# Patient Record
Sex: FEMALE | Race: WHITE | NOT HISPANIC OR LATINO | URBAN - METROPOLITAN AREA
[De-identification: names, ages, dates, MRNs, and addresses within clinical notes are randomized per-mention and may not be internally consistent; named-entity substitution may affect disease eponyms.]

---

## 2024-02-11 NOTE — PROGRESS NOTES
"Annual  Subjective   Shy Osorio is a 43 y.o. female who is here for a routine exam.     Complaints:   irritability, mood changes  Periods: regular:     Dysmenorrhea:  none    Current contraception:   History of abnormal Pap smear: no  History of abnormal mammogram: no      OB History          3    Para   1    Term   1            AB   2    Living   1         SAB   1    IAB        Ectopic   1    Multiple        Live Births   1                  Review of Systems    Objective   /68   Ht 1.651 m (5' 5\")   Wt 57.6 kg (127 lb)   LMP 2024 (Exact Date)   BMI 21.13 kg/m²        General:   Alert and oriented, in no acute distress   Neck: Supple. No visible thyromegaly.    Breast/Axilla: Normal to palpation bilaterally without masses, skin changes, or nipple discharge.    Abdomen: Soft, non-tender, without masses or organomegaly   Vulva: Normal architecture without erythema, masses, or lesions.    Vagina: Normal mucosa without lesions, masses, or atrophy. No abnormal vaginal discharge.    Cervix: Normal without masses, lesions, or signs of cervicitis   Uterus: Normal, mobile, non-enlarged uterus   Adnexa: Normal without masses or lesions   Pelvic Floor normal   Psych Normal affect. Normal mood.      Assessment/Plan   Diagnoses and all orders for this visit:  Encounter for gynecological examination without abnormal finding  -     THINPREP PAP TEST  Screening mammogram for breast cancer  -     BI mammo bilateral screening tomosynthesis; Future  Menopause  -     Follicle Stimulating Hormone; Future  -     Luteinizing Hormone; Future    Routine annual    Pap due  Mammogram    Vi Tripp MD   "

## 2024-02-15 ENCOUNTER — OFFICE VISIT (OUTPATIENT)
Dept: OBSTETRICS AND GYNECOLOGY | Facility: CLINIC | Age: 44
End: 2024-02-15
Payer: COMMERCIAL

## 2024-02-15 ENCOUNTER — LAB (OUTPATIENT)
Dept: LAB | Facility: LAB | Age: 44
End: 2024-02-15
Payer: COMMERCIAL

## 2024-02-15 VITALS
DIASTOLIC BLOOD PRESSURE: 68 MMHG | BODY MASS INDEX: 21.16 KG/M2 | WEIGHT: 127 LBS | HEIGHT: 65 IN | SYSTOLIC BLOOD PRESSURE: 110 MMHG

## 2024-02-15 DIAGNOSIS — Z78.0 MENOPAUSE: ICD-10-CM

## 2024-02-15 DIAGNOSIS — Z01.419 ENCOUNTER FOR GYNECOLOGICAL EXAMINATION WITHOUT ABNORMAL FINDING: Primary | ICD-10-CM

## 2024-02-15 DIAGNOSIS — Z12.31 SCREENING MAMMOGRAM FOR BREAST CANCER: ICD-10-CM

## 2024-02-15 PROCEDURE — 83001 ASSAY OF GONADOTROPIN (FSH): CPT

## 2024-02-15 PROCEDURE — 1036F TOBACCO NON-USER: CPT | Performed by: OBSTETRICS & GYNECOLOGY

## 2024-02-15 PROCEDURE — 36415 COLL VENOUS BLD VENIPUNCTURE: CPT

## 2024-02-15 PROCEDURE — 88141 CYTOPATH C/V INTERPRET: CPT | Performed by: PATHOLOGY

## 2024-02-15 PROCEDURE — 83002 ASSAY OF GONADOTROPIN (LH): CPT

## 2024-02-15 PROCEDURE — 87624 HPV HI-RISK TYP POOLED RSLT: CPT | Performed by: OBSTETRICS & GYNECOLOGY

## 2024-02-15 PROCEDURE — 88175 CYTOPATH C/V AUTO FLUID REDO: CPT | Mod: TC,GCY | Performed by: OBSTETRICS & GYNECOLOGY

## 2024-02-15 PROCEDURE — 99396 PREV VISIT EST AGE 40-64: CPT | Performed by: OBSTETRICS & GYNECOLOGY

## 2024-02-15 RX ORDER — VALACYCLOVIR HYDROCHLORIDE 500 MG/1
500 TABLET, FILM COATED ORAL 3 TIMES DAILY PRN
COMMUNITY
Start: 2017-05-08

## 2024-02-15 ASSESSMENT — LIFESTYLE VARIABLES
HOW OFTEN DO YOU HAVE A DRINK CONTAINING ALCOHOL: 2-4 TIMES A MONTH
SKIP TO QUESTIONS 9-10: 0
AUDIT-C TOTAL SCORE: 3
HOW MANY STANDARD DRINKS CONTAINING ALCOHOL DO YOU HAVE ON A TYPICAL DAY: 3 OR 4
HOW OFTEN DO YOU HAVE SIX OR MORE DRINKS ON ONE OCCASION: NEVER

## 2024-02-15 ASSESSMENT — PATIENT HEALTH QUESTIONNAIRE - PHQ9
SUM OF ALL RESPONSES TO PHQ9 QUESTIONS 1 & 2: 0
1. LITTLE INTEREST OR PLEASURE IN DOING THINGS: NOT AT ALL
2. FEELING DOWN, DEPRESSED OR HOPELESS: NOT AT ALL

## 2024-02-15 ASSESSMENT — ENCOUNTER SYMPTOMS
OCCASIONAL FEELINGS OF UNSTEADINESS: 0
DEPRESSION: 0
LOSS OF SENSATION IN FEET: 0

## 2024-02-15 ASSESSMENT — PAIN SCALES - GENERAL: PAINLEVEL: 0-NO PAIN

## 2024-02-16 LAB
FSH SERPL-ACNC: 10.2 IU/L
LH SERPL-ACNC: 5.3 IU/L

## 2024-02-29 ENCOUNTER — OFFICE VISIT (OUTPATIENT)
Dept: PRIMARY CARE | Facility: CLINIC | Age: 44
End: 2024-02-29
Payer: COMMERCIAL

## 2024-02-29 VITALS
SYSTOLIC BLOOD PRESSURE: 124 MMHG | HEIGHT: 65 IN | WEIGHT: 129 LBS | HEART RATE: 88 BPM | OXYGEN SATURATION: 97 % | DIASTOLIC BLOOD PRESSURE: 72 MMHG | BODY MASS INDEX: 21.49 KG/M2

## 2024-02-29 DIAGNOSIS — Z13.29 SCREENING FOR ENDOCRINE DISORDER: ICD-10-CM

## 2024-02-29 DIAGNOSIS — Z13.220 LIPID SCREENING: ICD-10-CM

## 2024-02-29 DIAGNOSIS — F41.1 GAD (GENERALIZED ANXIETY DISORDER): ICD-10-CM

## 2024-02-29 DIAGNOSIS — Z13.0 SCREENING, ANEMIA, DEFICIENCY, IRON: ICD-10-CM

## 2024-02-29 DIAGNOSIS — Z00.00 ROUTINE GENERAL MEDICAL EXAMINATION AT A HEALTH CARE FACILITY: Primary | ICD-10-CM

## 2024-02-29 DIAGNOSIS — Z13.1 SCREENING FOR DIABETES MELLITUS: ICD-10-CM

## 2024-02-29 LAB
CYTOLOGY CMNT CVX/VAG CYTO-IMP: NORMAL
HPV HR 12 DNA GENITAL QL NAA+PROBE: NEGATIVE
HPV HR GENOTYPES PNL CVX NAA+PROBE: NEGATIVE
HPV16 DNA SPEC QL NAA+PROBE: NEGATIVE
HPV18 DNA SPEC QL NAA+PROBE: NEGATIVE
LAB AP HPV GENOTYPE QUESTION: YES
LAB AP HPV HR: NORMAL
LABORATORY COMMENT REPORT: NORMAL
PATH REPORT.TOTAL CANCER: NORMAL
POC APPEARANCE, URINE: CLEAR
POC BILIRUBIN, URINE: NEGATIVE
POC BLOOD, URINE: NEGATIVE
POC COLOR, URINE: YELLOW
POC GLUCOSE, URINE: NEGATIVE MG/DL
POC KETONES, URINE: NEGATIVE MG/DL
POC LEUKOCYTES, URINE: NEGATIVE
POC NITRITE,URINE: NEGATIVE
POC PH, URINE: 7 PH
POC PROTEIN, URINE: NEGATIVE MG/DL
POC SPECIFIC GRAVITY, URINE: 1.01
POC UROBILINOGEN, URINE: 0.2 EU/DL

## 2024-02-29 PROCEDURE — 1036F TOBACCO NON-USER: CPT | Performed by: FAMILY MEDICINE

## 2024-02-29 PROCEDURE — 81003 URINALYSIS AUTO W/O SCOPE: CPT | Mod: QW | Performed by: FAMILY MEDICINE

## 2024-02-29 PROCEDURE — 99396 PREV VISIT EST AGE 40-64: CPT | Performed by: FAMILY MEDICINE

## 2024-02-29 ASSESSMENT — ENCOUNTER SYMPTOMS
DYSPHORIC MOOD: 0
SLEEP DISTURBANCE: 1
HEMATOLOGIC/LYMPHATIC NEGATIVE: 1
MUSCULOSKELETAL NEGATIVE: 1
CARDIOVASCULAR NEGATIVE: 1
PAIN: 1
NEUROLOGICAL NEGATIVE: 1
CONSTITUTIONAL NEGATIVE: 1
GASTROINTESTINAL NEGATIVE: 1
RESPIRATORY NEGATIVE: 1
NERVOUS/ANXIOUS: 1

## 2024-02-29 ASSESSMENT — PATIENT HEALTH QUESTIONNAIRE - PHQ9
2. FEELING DOWN, DEPRESSED OR HOPELESS: NOT AT ALL
SUM OF ALL RESPONSES TO PHQ9 QUESTIONS 1 AND 2: 0
1. LITTLE INTEREST OR PLEASURE IN DOING THINGS: NOT AT ALL

## 2024-02-29 ASSESSMENT — PAIN SCALES - GENERAL: PAINLEVEL: 0-NO PAIN

## 2024-02-29 NOTE — PROGRESS NOTES
[facility]: [facility]  PHYSICAL EXAM      Shy Osorio is a 43 y.o. female that is presenting today for Annual Exam (Patient is complaining of anxiety, happening more at night, intermittent/dd) and Pain (Patient is having pain on the left side/dd).     Subjective   42 yo here for annual exam    She is concerned about anxiety-  comes and goes usually daily.  Will wake up at night and have an issue falling asleep.  Episodes will last up to 2 hours.  Taking herbal suppliments, magnesium to help.  She reports some family issues.  Usually no issues falling asleep.  She doesn't want to use medication.     she states she has been getting low back sensitivity.      Exercise yoga, leonid notices it but still can do exercise.     Dr Tripp, checked hormones.     Does reports feet and hands are very red and burning during the winter more than summer.  Does cause some grief.           Pain      Review of Systems   Constitutional: Negative.    HENT: Negative.     Respiratory: Negative.     Cardiovascular: Negative.    Gastrointestinal: Negative.    Genitourinary: Negative.    Musculoskeletal: Negative.    Skin: Negative.    Neurological: Negative.    Hematological: Negative.    Psychiatric/Behavioral:  Positive for sleep disturbance. Negative for dysphoric mood. The patient is nervous/anxious.        History    History reviewed. No pertinent past medical history.  Past Surgical History:   Procedure Laterality Date    OTHER SURGICAL HISTORY  07/11/2016    Corneal LASIK Bilateral     No family history on file.  Allergies   Allergen Reactions    House Dust Mite Unknown    Cat's Claw Rash     Current Outpatient Medications on File Prior to Visit   Medication Sig Dispense Refill    valACYclovir (Valtrex) 500 mg tablet Take 1 tablet (500 mg) by mouth 3 times a day as needed.       No current facility-administered medications on file prior to visit.       There is no immunization history on file for this  patient.  Patient's medical history was reviewed and updated either before or during this encounter.    Objective   Vitals:    02/29/24 1027   BP: 124/72   Pulse: 88   SpO2: 97%      Physical Exam  Constitutional:       General: She is not in acute distress.     Appearance: Normal appearance. She is not ill-appearing.   HENT:      Right Ear: Tympanic membrane, ear canal and external ear normal. There is no impacted cerumen.      Left Ear: Tympanic membrane, ear canal and external ear normal.      Nose: Nose normal.      Mouth/Throat:      Pharynx: Oropharynx is clear. No posterior oropharyngeal erythema.   Eyes:      Extraocular Movements: Extraocular movements intact.      Pupils: Pupils are equal, round, and reactive to light.   Neck:      Thyroid: No thyroid mass or thyroid tenderness.      Vascular: No carotid bruit.   Cardiovascular:      Rate and Rhythm: Normal rate and regular rhythm.      Pulses:           Radial pulses are 2+ on the right side and 2+ on the left side.        Dorsalis pedis pulses are 2+ on the right side and 2+ on the left side.      Heart sounds: Normal heart sounds. No murmur heard.     No friction rub. No gallop.   Pulmonary:      Effort: Pulmonary effort is normal.      Breath sounds: Normal breath sounds.   Abdominal:      General: Bowel sounds are normal.      Palpations: Abdomen is soft. There is no hepatomegaly or splenomegaly.      Tenderness: There is no abdominal tenderness.   Musculoskeletal:      Cervical back: Normal range of motion. No tenderness.      Right lower leg: No edema.      Left lower leg: No edema.      Comments: No gross abnormalities back is NT no CVAT.     Lymphadenopathy:      Cervical: No cervical adenopathy.      Upper Body:      Right upper body: No supraclavicular adenopathy.      Left upper body: No supraclavicular adenopathy.   Skin:     General: Skin is warm.      Capillary Refill: Capillary refill takes less than 2 seconds.      Comments: Hands and  fingers bilaterally very red, blanchable.     Neurological:      General: No focal deficit present.      Mental Status: She is alert.      Cranial Nerves: Cranial nerves 2-12 are intact.      Coordination: Coordination is intact.      Gait: Gait is intact.      Comments: No obvious neurological deficits    Psychiatric:         Mood and Affect: Mood and affect normal.         Assessment/Plan      There is no problem list on file for this patient.    Diagnoses and all orders for this visit:  Routine general medical examination at a health care facility  -     Comprehensive Metabolic Panel; Future  -     TSH with reflex to Free T4 if abnormal; Future  -     CBC and Auto Differential; Future  -     Lipid Panel; Future  -     POCT UA Automated manually resulted  Screening for diabetes mellitus  -     Comprehensive Metabolic Panel; Future  -     Lipid Panel; Future  Lipid screening  -     Lipid Panel; Future  TAYLOR (generalized anxiety disorder)  Screening for endocrine disorder  -     TSH with reflex to Free T4 if abnormal; Future  Screening, anemia, deficiency, iron  -     CBC and Auto Differential; Future      The patient was encouraged to ensure that any/all documentation is accurate and up to date, and that our office be provided a copy in the event that anything changes.    Jose Shin MD

## 2024-03-01 ENCOUNTER — LAB (OUTPATIENT)
Dept: LAB | Facility: LAB | Age: 44
End: 2024-03-01
Payer: COMMERCIAL

## 2024-03-01 DIAGNOSIS — Z13.29 SCREENING FOR ENDOCRINE DISORDER: ICD-10-CM

## 2024-03-01 DIAGNOSIS — Z13.220 LIPID SCREENING: ICD-10-CM

## 2024-03-01 DIAGNOSIS — Z00.00 ROUTINE GENERAL MEDICAL EXAMINATION AT A HEALTH CARE FACILITY: ICD-10-CM

## 2024-03-01 DIAGNOSIS — Z13.1 SCREENING FOR DIABETES MELLITUS: ICD-10-CM

## 2024-03-01 DIAGNOSIS — Z13.0 SCREENING, ANEMIA, DEFICIENCY, IRON: ICD-10-CM

## 2024-03-01 LAB
ALBUMIN SERPL-MCNC: 4.6 G/DL (ref 3.5–5)
ALP BLD-CCNC: 45 U/L (ref 35–125)
ALT SERPL-CCNC: 8 U/L (ref 5–40)
ANION GAP SERPL CALC-SCNC: 13 MMOL/L
AST SERPL-CCNC: 16 U/L (ref 5–40)
BASOPHILS # BLD AUTO: 0.07 X10*3/UL (ref 0–0.1)
BASOPHILS NFR BLD AUTO: 1.4 %
BILIRUB SERPL-MCNC: 0.8 MG/DL (ref 0.1–1.2)
BUN SERPL-MCNC: 10 MG/DL (ref 8–25)
CALCIUM SERPL-MCNC: 9.3 MG/DL (ref 8.5–10.4)
CHLORIDE SERPL-SCNC: 101 MMOL/L (ref 97–107)
CHOLEST SERPL-MCNC: 199 MG/DL (ref 133–200)
CHOLEST/HDLC SERPL: 2.1 {RATIO}
CO2 SERPL-SCNC: 24 MMOL/L (ref 24–31)
CREAT SERPL-MCNC: 0.8 MG/DL (ref 0.4–1.6)
EGFRCR SERPLBLD CKD-EPI 2021: >90 ML/MIN/1.73M*2
EOSINOPHIL # BLD AUTO: 0.04 X10*3/UL (ref 0–0.7)
EOSINOPHIL NFR BLD AUTO: 0.8 %
ERYTHROCYTE [DISTWIDTH] IN BLOOD BY AUTOMATED COUNT: 12.1 % (ref 11.5–14.5)
GLUCOSE SERPL-MCNC: 86 MG/DL (ref 65–99)
HCT VFR BLD AUTO: 42.5 % (ref 36–46)
HDLC SERPL-MCNC: 93 MG/DL
HGB BLD-MCNC: 14.8 G/DL (ref 12–16)
IMM GRANULOCYTES # BLD AUTO: 0.01 X10*3/UL (ref 0–0.7)
IMM GRANULOCYTES NFR BLD AUTO: 0.2 % (ref 0–0.9)
LDLC SERPL CALC-MCNC: 96 MG/DL (ref 65–130)
LYMPHOCYTES # BLD AUTO: 1.44 X10*3/UL (ref 1.2–4.8)
LYMPHOCYTES NFR BLD AUTO: 29.1 %
MCH RBC QN AUTO: 35.5 PG (ref 26–34)
MCHC RBC AUTO-ENTMCNC: 34.8 G/DL (ref 32–36)
MCV RBC AUTO: 102 FL (ref 80–100)
MONOCYTES # BLD AUTO: 0.44 X10*3/UL (ref 0.1–1)
MONOCYTES NFR BLD AUTO: 8.9 %
NEUTROPHILS # BLD AUTO: 2.95 X10*3/UL (ref 1.2–7.7)
NEUTROPHILS NFR BLD AUTO: 59.6 %
NRBC BLD-RTO: 0 /100 WBCS (ref 0–0)
PLATELET # BLD AUTO: 200 X10*3/UL (ref 150–450)
POTASSIUM SERPL-SCNC: 4.7 MMOL/L (ref 3.4–5.1)
PROT SERPL-MCNC: 6.9 G/DL (ref 5.9–7.9)
RBC # BLD AUTO: 4.17 X10*6/UL (ref 4–5.2)
SODIUM SERPL-SCNC: 138 MMOL/L (ref 133–145)
TRIGL SERPL-MCNC: 51 MG/DL (ref 40–150)
TSH SERPL DL<=0.05 MIU/L-ACNC: 1.81 MIU/L (ref 0.27–4.2)
WBC # BLD AUTO: 5 X10*3/UL (ref 4.4–11.3)

## 2024-03-01 PROCEDURE — 85025 COMPLETE CBC W/AUTO DIFF WBC: CPT

## 2024-03-01 PROCEDURE — 80053 COMPREHEN METABOLIC PANEL: CPT

## 2024-03-01 PROCEDURE — 36415 COLL VENOUS BLD VENIPUNCTURE: CPT

## 2024-03-01 PROCEDURE — 84443 ASSAY THYROID STIM HORMONE: CPT

## 2024-03-01 PROCEDURE — 80061 LIPID PANEL: CPT

## 2024-03-04 ENCOUNTER — TELEPHONE (OUTPATIENT)
Dept: PRIMARY CARE | Facility: CLINIC | Age: 44
End: 2024-03-04
Payer: COMMERCIAL

## 2024-03-04 NOTE — TELEPHONE ENCOUNTER
----- Message from Jose Shin MD sent at 3/3/2024  7:26 PM EST -----  Labs are all good, can repeat in 1 -2 years

## 2024-03-11 ENCOUNTER — HOSPITAL ENCOUNTER (OUTPATIENT)
Dept: RADIOLOGY | Facility: CLINIC | Age: 44
Discharge: HOME | End: 2024-03-11
Payer: COMMERCIAL

## 2024-03-11 VITALS — WEIGHT: 125 LBS | BODY MASS INDEX: 20.83 KG/M2 | HEIGHT: 65 IN

## 2024-03-11 DIAGNOSIS — Z12.31 SCREENING MAMMOGRAM FOR BREAST CANCER: ICD-10-CM

## 2024-03-11 PROCEDURE — 77067 SCR MAMMO BI INCL CAD: CPT | Performed by: RADIOLOGY

## 2024-03-11 PROCEDURE — 77067 SCR MAMMO BI INCL CAD: CPT

## 2024-03-11 PROCEDURE — 77063 BREAST TOMOSYNTHESIS BI: CPT | Performed by: RADIOLOGY

## 2024-06-17 ENCOUNTER — TELEPHONE (OUTPATIENT)
Dept: OBSTETRICS AND GYNECOLOGY | Facility: CLINIC | Age: 44
End: 2024-06-17
Payer: COMMERCIAL

## 2024-06-17 NOTE — TELEPHONE ENCOUNTER
Pt requesting a refill for Valtrex.  Pt states gets a 30 day supply but takes as needed.  Valtrex 500mg 1 daily #30 with 2 refills called to [facility]

## 2024-09-11 DIAGNOSIS — B00.9 HSV (HERPES SIMPLEX VIRUS) INFECTION: Primary | ICD-10-CM

## 2024-09-11 RX ORDER — VALACYCLOVIR HYDROCHLORIDE 500 MG/1
500 TABLET, FILM COATED ORAL DAILY
Qty: 30 TABLET | Refills: 2 | Status: SHIPPED | OUTPATIENT
Start: 2024-09-11

## 2024-10-23 ENCOUNTER — TELEMEDICINE (OUTPATIENT)
Dept: PRIMARY CARE | Facility: CLINIC | Age: 44
End: 2024-10-23
Payer: COMMERCIAL

## 2024-10-23 VITALS — HEIGHT: 65 IN | WEIGHT: 145 LBS | BODY MASS INDEX: 24.16 KG/M2

## 2024-10-23 DIAGNOSIS — J32.9 OTHER SINUSITIS, UNSPECIFIED CHRONICITY: Primary | ICD-10-CM

## 2024-10-23 PROCEDURE — 3008F BODY MASS INDEX DOCD: CPT | Performed by: FAMILY MEDICINE

## 2024-10-23 PROCEDURE — 1036F TOBACCO NON-USER: CPT | Performed by: FAMILY MEDICINE

## 2024-10-23 PROCEDURE — 99441 PR PHYS/QHP TELEPHONE EVALUATION 5-10 MIN: CPT | Performed by: FAMILY MEDICINE

## 2024-10-23 RX ORDER — AMOXICILLIN AND CLAVULANATE POTASSIUM 875; 125 MG/1; MG/1
875 TABLET, FILM COATED ORAL 2 TIMES DAILY
Qty: 20 TABLET | Refills: 0 | Status: SHIPPED | OUTPATIENT
Start: 2024-10-23 | End: 2024-11-02

## 2024-10-23 ASSESSMENT — PATIENT HEALTH QUESTIONNAIRE - PHQ9
1. LITTLE INTEREST OR PLEASURE IN DOING THINGS: NOT AT ALL
SUM OF ALL RESPONSES TO PHQ9 QUESTIONS 1 AND 2: 0
2. FEELING DOWN, DEPRESSED OR HOPELESS: NOT AT ALL

## 2024-10-23 ASSESSMENT — ENCOUNTER SYMPTOMS
CHILLS: 0
SORE THROAT: 1
FEVER: 0
RHINORRHEA: 1
SINUS PRESSURE: 1
SINUS PAIN: 1
SHORTNESS OF BREATH: 0

## 2024-10-23 NOTE — PROGRESS NOTES
[facility]: [facility]  E/M EVALUATION    Shy Osorio is a 44 y.o. female who presents for URI (Cough, congestion x7 days).    Subjective   Telemed, phone only, permission granted    Ill 6 days, getting worse ST, sinus pressure each day.  Congestion severe, cannot breath through nose.  Tea, silvia, honey.  Right facial pressure. Right hearing less.     URI   Associated symptoms include congestion, rhinorrhea, sinus pain and a sore throat. Pertinent negatives include no ear pain.     Review of Systems   Constitutional:  Negative for chills and fever.   HENT:  Positive for congestion, rhinorrhea, sinus pressure, sinus pain and sore throat. Negative for ear pain.    Respiratory:  Negative for shortness of breath.        Objective   There were no vitals filed for this visit.  Physical Exam  No  exam phone only    Assessment/Plan      There is no problem list on file for this patient.      Diagnoses and all orders for this visit:  Other sinusitis, unspecified chronicity  -     amoxicillin-pot clavulanate (Augmentin) 875-125 mg tablet; Take 1 tablet (875 mg) by mouth 2 times a day for 10 days.      The patient was encouraged to ensure that any/all documentation is accurate and up to date, and that our office be provided a copy in the event that anything changes.         Jose Shin MD

## 2024-12-19 ENCOUNTER — TELEPHONE (OUTPATIENT)
Dept: OBSTETRICS AND GYNECOLOGY | Facility: CLINIC | Age: 44
End: 2024-12-19
Payer: COMMERCIAL

## 2024-12-19 DIAGNOSIS — Z12.31 ENCOUNTER FOR SCREENING MAMMOGRAM FOR MALIGNANT NEOPLASM OF BREAST: Primary | ICD-10-CM

## 2025-02-01 DIAGNOSIS — B00.9 HSV (HERPES SIMPLEX VIRUS) INFECTION: ICD-10-CM

## 2025-02-03 RX ORDER — VALACYCLOVIR HYDROCHLORIDE 500 MG/1
500 TABLET, FILM COATED ORAL DAILY
Qty: 30 TABLET | Refills: 2 | Status: SHIPPED | OUTPATIENT
Start: 2025-02-03

## 2025-02-19 ENCOUNTER — OFFICE VISIT (OUTPATIENT)
Dept: OBSTETRICS AND GYNECOLOGY | Facility: CLINIC | Age: 45
End: 2025-02-19
Payer: COMMERCIAL

## 2025-02-19 VITALS
WEIGHT: 127.8 LBS | DIASTOLIC BLOOD PRESSURE: 76 MMHG | HEIGHT: 65 IN | BODY MASS INDEX: 21.29 KG/M2 | SYSTOLIC BLOOD PRESSURE: 115 MMHG

## 2025-02-19 DIAGNOSIS — Z15.01 BRCA1 POSITIVE: ICD-10-CM

## 2025-02-19 DIAGNOSIS — N84.1 CERVICAL POLYP: ICD-10-CM

## 2025-02-19 DIAGNOSIS — R92.2 DENSE BREASTS: ICD-10-CM

## 2025-02-19 DIAGNOSIS — Z91.89 INCREASED RISK OF BREAST CANCER: ICD-10-CM

## 2025-02-19 DIAGNOSIS — R92.30 DENSE BREASTS: ICD-10-CM

## 2025-02-19 DIAGNOSIS — Z80.3 FAMILY HISTORY OF BREAST CANCER: ICD-10-CM

## 2025-02-19 DIAGNOSIS — Z15.09 BRCA1 POSITIVE: ICD-10-CM

## 2025-02-19 DIAGNOSIS — Z12.31 ENCOUNTER FOR SCREENING MAMMOGRAM FOR MALIGNANT NEOPLASM OF BREAST: ICD-10-CM

## 2025-02-19 DIAGNOSIS — Z01.419 ENCOUNTER FOR ANNUAL ROUTINE GYNECOLOGICAL EXAMINATION: Primary | ICD-10-CM

## 2025-02-19 PROCEDURE — 3008F BODY MASS INDEX DOCD: CPT

## 2025-02-19 PROCEDURE — 1036F TOBACCO NON-USER: CPT

## 2025-02-19 PROCEDURE — 99396 PREV VISIT EST AGE 40-64: CPT

## 2025-02-19 RX ORDER — MULTIVIT-MIN/IRON FUM/FOLIC AC 7.5 MG-4
1 TABLET ORAL DAILY
COMMUNITY

## 2025-02-19 RX ORDER — IBUPROFEN 100 MG/5ML
1000 SUSPENSION, ORAL (FINAL DOSE FORM) ORAL DAILY
COMMUNITY

## 2025-02-19 ASSESSMENT — ENCOUNTER SYMPTOMS
DYSURIA: 0
LOSS OF SENSATION IN FEET: 0
COLOR CHANGE: 0
OCCASIONAL FEELINGS OF UNSTEADINESS: 0
COUGH: 0
VOMITING: 0
UNEXPECTED WEIGHT CHANGE: 0
ABDOMINAL PAIN: 0
NAUSEA: 0
FATIGUE: 0
FEVER: 0
DIZZINESS: 0
SHORTNESS OF BREATH: 0
DEPRESSION: 0
HEADACHES: 0
CHILLS: 0

## 2025-02-19 ASSESSMENT — LIFESTYLE VARIABLES
HOW MANY STANDARD DRINKS CONTAINING ALCOHOL DO YOU HAVE ON A TYPICAL DAY: 1 OR 2
HOW OFTEN DO YOU HAVE SIX OR MORE DRINKS ON ONE OCCASION: NEVER
HOW OFTEN DO YOU HAVE A DRINK CONTAINING ALCOHOL: 4 OR MORE TIMES A WEEK
AUDIT-C TOTAL SCORE: 4
SKIP TO QUESTIONS 9-10: 1

## 2025-02-19 ASSESSMENT — PATIENT HEALTH QUESTIONNAIRE - PHQ9
2. FEELING DOWN, DEPRESSED OR HOPELESS: NOT AT ALL
SUM OF ALL RESPONSES TO PHQ9 QUESTIONS 1 & 2: 0
1. LITTLE INTEREST OR PLEASURE IN DOING THINGS: NOT AT ALL

## 2025-02-19 ASSESSMENT — PAIN SCALES - GENERAL: PAINLEVEL_OUTOF10: 0-NO PAIN

## 2025-02-19 NOTE — PROGRESS NOTES
"Subjective   Shy Osorio is a 44 y.o. female who is here for a routine GYN exam. Last saw Dr. Tripp 2024.     -Menses regular, once monthly, 4-5 days duration; skipped cycle at October when she was really sick; they have since regulated again.  -Denies breast or vaginal concerns.  -Hx of BRCA1 gene mutation positive. Has previously done a breast MRI but it was very costly.     Complaints:   none  Periods: regular   Dysmenorrhea:  none    Current contraception: condoms  History of abnormal Pap smear: no  History of abnormal mammogram: no      OB History          4    Para   2    Term   2            AB   2    Living   2         SAB   1    IAB        Ectopic   1    Multiple        Live Births   2                  Review of Systems   Constitutional:  Negative for chills, fatigue, fever and unexpected weight change.   Respiratory:  Negative for cough and shortness of breath.    Gastrointestinal:  Negative for abdominal pain, nausea and vomiting.   Genitourinary:  Negative for dyspareunia, dysuria, pelvic pain and vaginal discharge.   Skin:  Negative for color change and rash.   Neurological:  Negative for dizziness and headaches.       Objective   /76   Ht 1.651 m (5' 5\")   Wt 58 kg (127 lb 12.8 oz)   LMP 2025   BMI 21.27 kg/m²        General:   Alert and oriented, in no acute distress   Neck: Supple. No visible thyromegaly.    Breast/Axilla: Normal to palpation bilaterally without masses, skin changes, or nipple discharge.    Abdomen: Soft, non-tender, without masses or organomegaly   Vulva: Normal architecture without erythema, masses, or lesions.    Vagina: Normal mucosa without lesions, masses, or atrophy. No abnormal vaginal discharge.    Cervix: Pink/normal appearance with lima bean sized erythematous flappy, mobile cervical polyp from os   Uterus: Normal, mobile, non-enlarged uterus   Adnexa: Normal without masses or lesions   Pelvic Floor Normal    Psych Normal affect. Normal " mood.      Assessment/Plan   Diagnoses and all orders for this visit:  Encounter for annual routine gynecological examination   - UTD on pap smear, next due 2/2027.  Encounter for screening mammogram for malignant neoplasm of breast  - Mammogram scheduled for 3/12/25 at [facility]  Increased risk of breast cancer  -     MR breast bilateral w contrast full protocol; Future  BRCA1 positive  -     MR breast bilateral w contrast full protocol; Future  Family history of breast cancer  -     MR breast bilateral w contrast full protocol; Future  Dense breasts  -     MR breast bilateral w contrast full protocol; Future  Cervical polyp   - Currently asx; denies PCB or IMB.   - Recommended removal with one of the physicians; scheduled with CB 4/2/25.    Sue Frias PA-C

## 2025-03-04 ENCOUNTER — OFFICE VISIT (OUTPATIENT)
Dept: PRIMARY CARE | Facility: CLINIC | Age: 45
End: 2025-03-04
Payer: COMMERCIAL

## 2025-03-04 VITALS
HEART RATE: 81 BPM | OXYGEN SATURATION: 100 % | BODY MASS INDEX: 21.27 KG/M2 | HEIGHT: 65 IN | SYSTOLIC BLOOD PRESSURE: 110 MMHG | DIASTOLIC BLOOD PRESSURE: 66 MMHG

## 2025-03-04 DIAGNOSIS — Z00.00 WELLNESS EXAMINATION: ICD-10-CM

## 2025-03-04 DIAGNOSIS — E55.9 VITAMIN D DEFICIENCY: Primary | ICD-10-CM

## 2025-03-04 DIAGNOSIS — M62.830 LUMBAR PARASPINAL MUSCLE SPASM: ICD-10-CM

## 2025-03-04 DIAGNOSIS — E53.8 B12 DEFICIENCY: ICD-10-CM

## 2025-03-04 DIAGNOSIS — B00.9 HSV (HERPES SIMPLEX VIRUS) INFECTION: ICD-10-CM

## 2025-03-04 DIAGNOSIS — I73.00 RAYNAUD'S PHENOMENON WITHOUT GANGRENE: ICD-10-CM

## 2025-03-04 DIAGNOSIS — D50.8 IRON DEFICIENCY ANEMIA SECONDARY TO INADEQUATE DIETARY IRON INTAKE: ICD-10-CM

## 2025-03-04 PROCEDURE — 99214 OFFICE O/P EST MOD 30 MIN: CPT | Performed by: FAMILY MEDICINE

## 2025-03-04 PROCEDURE — 99396 PREV VISIT EST AGE 40-64: CPT | Performed by: FAMILY MEDICINE

## 2025-03-04 PROCEDURE — 1036F TOBACCO NON-USER: CPT | Performed by: FAMILY MEDICINE

## 2025-03-04 RX ORDER — VALACYCLOVIR HYDROCHLORIDE 500 MG/1
500 TABLET, FILM COATED ORAL DAILY
Qty: 90 TABLET | Refills: 3 | Status: SHIPPED | OUTPATIENT
Start: 2025-03-04 | End: 2026-03-04

## 2025-03-04 RX ORDER — CYCLOBENZAPRINE HCL 10 MG
10 TABLET ORAL NIGHTLY PRN
Qty: 30 TABLET | Refills: 0 | Status: SHIPPED | OUTPATIENT
Start: 2025-03-04 | End: 2025-05-03

## 2025-03-04 RX ORDER — NAPROXEN 500 MG/1
500 TABLET ORAL 2 TIMES DAILY PRN
Qty: 60 TABLET | Refills: 0 | Status: SHIPPED | OUTPATIENT
Start: 2025-03-04 | End: 2025-06-02

## 2025-03-04 ASSESSMENT — PAIN SCALES - GENERAL: PAINLEVEL_OUTOF10: 0-NO PAIN

## 2025-03-04 ASSESSMENT — PATIENT HEALTH QUESTIONNAIRE - PHQ9
1. LITTLE INTEREST OR PLEASURE IN DOING THINGS: NOT AT ALL
2. FEELING DOWN, DEPRESSED OR HOPELESS: NOT AT ALL
SUM OF ALL RESPONSES TO PHQ9 QUESTIONS 1 AND 2: 0

## 2025-03-04 ASSESSMENT — ENCOUNTER SYMPTOMS
OCCASIONAL FEELINGS OF UNSTEADINESS: 0
DEPRESSION: 0
LOSS OF SENSATION IN FEET: 0

## 2025-03-04 ASSESSMENT — COLUMBIA-SUICIDE SEVERITY RATING SCALE - C-SSRS
6. HAVE YOU EVER DONE ANYTHING, STARTED TO DO ANYTHING, OR PREPARED TO DO ANYTHING TO END YOUR LIFE?: NO
2. HAVE YOU ACTUALLY HAD ANY THOUGHTS OF KILLING YOURSELF?: NO
1. IN THE PAST MONTH, HAVE YOU WISHED YOU WERE DEAD OR WISHED YOU COULD GO TO SLEEP AND NOT WAKE UP?: NO

## 2025-03-04 NOTE — PROGRESS NOTES
44-year-old presents to clinic to establish care for yearly physical follow chronic medical conditions new complaints      Health Maintenance:  Eats a varied and healthy diet.  Exercises regularly.  Does not drink, smoke, use illicit substances.  Due for Colonoscopy and Otherwise up-to-date on all routine health maintenance screenings.  Due for immunizations.  Due for yearly lab work.    1. Vitamin D deficiency    2. Wellness examination    3. HSV (herpes simplex virus) infection   Take suppressive therapy no recent outbreaks tolerate medication well   4. Lumbar paraspinal muscle spasm   States that a couple times per month she will notice a spasm like pain sensation in her upper lumbar spine that will last for a couple of days.  Worsens with certain movements.  Self resolves.  No numbness or tingling or other neurologic symptoms.  No prior trauma or injury   5. Raynaud's phenomenon without gangrene   States that she notices with cold exposure she will have color changing in her hands especially hands going extremely red hot, white and then blue different sequences depending on exposure.   6. B12 deficiency   Prior to B12 deficiency  History of iron deficiency   7. Iron deficiency anemia secondary to inadequate dietary iron intake   History of iron deficiency needs rechecked       All pertinent positive symptoms are included in history of present illness.    All other systems have been reviewed and are negative and noncontributory to this patient's current ailments.      CONSTITUTIONAL - INAD. Not ill appearing.  SKIN - No lesions or rashes visualized. Good skin turgor.  HEENT- Head is atraumatic and normocephalic. Nasal turbinates are nonerythematous and without drainage. .  RESP - CTAB. No wheezing, rhonchi, or crackles.   CARDIAC - RRR. No murmurs, gallops, or rubs.  ABDOMEN - Soft, nontender, nondistended. No organomegaly.  NEURO - CNs 2-12 grossly intact.  PSYCH - Normal mood and affect      1. Vitamin D  deficiency (Primary)    - Vitamin D 25-Hydroxy,Total (for eval of Vitamin D levels); Future  - Vitamin D 25-Hydroxy,Total (for eval of Vitamin D levels)    2. Wellness examination  Health and wellness topics discussed today.  Recommended eating a varied and healthy diet and made dietary recommendations, also discussed exercise and exercising regularly 30 minutes a day 3 days a week.  Immunizations recommended and updated.  Health screening guidelines discussed with patient including possible things such as colonoscopies, mammograms, prostate screenings, lung cancer screenings, bone densitometry, and other wellness topics.  Yearly lab work ordered today.  - CBC and Auto Differential; Future  - Comprehensive Metabolic Panel; Future  - Lipid Panel; Future  - Hemoglobin A1C; Future  - TSH with reflex to Free T4 if abnormal; Future  - Vitamin D 25-Hydroxy,Total (for eval of Vitamin D levels); Future  - HIV 1/2 Antigen/Antibody Screen with Reflex to Confirmation; Future  - Hepatitis C antibody; Future  - CBC and Auto Differential  - Comprehensive Metabolic Panel  - Lipid Panel  - Hemoglobin A1C  - TSH with reflex to Free T4 if abnormal  - Vitamin D 25-Hydroxy,Total (for eval of Vitamin D levels)  - HIV 1/2 Antigen/Antibody Screen with Reflex to Confirmation  - Hepatitis C antibody    3. HSV (herpes simplex virus) infection  Well-controlled on suppressive therapy continue Valtrex daily  - valACYclovir (Valtrex) 500 mg tablet; Take 1 tablet (500 mg) by mouth once daily.  Dispense: 90 tablet; Refill: 3    4. Lumbar paraspinal muscle spasm  We had a long discussion with regards to the source of your pain and possible treatments and options for it.  We spoke extensively about differential diagnosis including disc herniation, fracture, spondylolysis, spondylosis, spondylolisthesis.  Spoke extensively with the patient with regards to treatment options including conservative treatment such as physical therapy and the need to  perform physical therapy to evaluate for response to treatment.  Spoke about the possibility for surgical correction in the future as well as an MRI if necessary.     Patient did not display any red flag signs or symptoms that require emergent work-up currently. Spoke about initial treatment typically consisting of conservative management including anti-inflammatories, steroids, physical therapy, as well as possible imaging studies to evaluate for any bony abnormalities.      Patient was informed of the side effects associated with chronic NSAID use including gastritis and increased risk of bleeding due to chronic NSAID use.    Spoke about the use of steroids for the treatment of pain and inflammation.  Patient informed not to take the steroids along with the NSAIDs due to increased risk for gastritis.    X-rays of the spine were ordered/reviewed today to evaluate for any bony abnormalities.    Follow-up in 4 to 6 weeks.  - naproxen (Naprosyn) 500 mg tablet; Take 1 tablet (500 mg) by mouth 2 times a day as needed for mild pain (1 - 3) (pain).  Dispense: 60 tablet; Refill: 0  - cyclobenzaprine (Flexeril) 10 mg tablet; Take 1 tablet (10 mg) by mouth as needed at bedtime for muscle spasms.  Dispense: 30 tablet; Refill: 0  - Referral to Physical Therapy; Future    5. Raynaud's phenomenon without gangrene  Likely Raynaud's from description.  Will check autoimmune panel to rule out underlying autoimmune disorder.  Avoiding cold exposure, could consider CCB in the future  - Rheumatoid Factor; Future  - C-Reactive Protein; Future  - Sedimentation Rate; Future  - ANABELLA with Reflex to DENNYS; Future  - Citrulline Antibody, IgG; Future  - Rheumatoid Factor  - C-Reactive Protein  - Sedimentation Rate  - ANABELLA with Reflex to DENNYS  - Citrulline Antibody, IgG    6. B12 deficiency  Check  - Vitamin B12; Future  - Folate; Future  - Vitamin B12  - Folate    7. Iron deficiency anemia secondary to inadequate dietary iron intake  Check  - Iron  and TIBC; Future  - Iron and TIBC

## 2025-03-12 ENCOUNTER — HOSPITAL ENCOUNTER (OUTPATIENT)
Dept: RADIOLOGY | Facility: CLINIC | Age: 45
Discharge: HOME | End: 2025-03-12
Payer: COMMERCIAL

## 2025-03-12 VITALS — WEIGHT: 128 LBS | BODY MASS INDEX: 21.33 KG/M2 | HEIGHT: 65 IN

## 2025-03-12 DIAGNOSIS — Z12.31 ENCOUNTER FOR SCREENING MAMMOGRAM FOR MALIGNANT NEOPLASM OF BREAST: ICD-10-CM

## 2025-03-12 PROCEDURE — 77063 BREAST TOMOSYNTHESIS BI: CPT

## 2025-03-12 PROCEDURE — 77063 BREAST TOMOSYNTHESIS BI: CPT | Performed by: RADIOLOGY

## 2025-03-12 PROCEDURE — 77067 SCR MAMMO BI INCL CAD: CPT | Performed by: RADIOLOGY

## 2025-03-14 LAB
25(OH)D3+25(OH)D2 SERPL-MCNC: 35 NG/ML (ref 30–100)
ALBUMIN SERPL-MCNC: 4.6 G/DL (ref 3.6–5.1)
ALP SERPL-CCNC: 39 U/L (ref 31–125)
ALT SERPL-CCNC: 12 U/L (ref 6–29)
ANA SER QL IF: NORMAL
ANION GAP SERPL CALCULATED.4IONS-SCNC: 10 MMOL/L (CALC) (ref 7–17)
AST SERPL-CCNC: 17 U/L (ref 10–35)
BASOPHILS # BLD AUTO: 41 CELLS/UL (ref 0–200)
BASOPHILS NFR BLD AUTO: 0.9 %
BILIRUB SERPL-MCNC: 1 MG/DL (ref 0.2–1.2)
BUN SERPL-MCNC: 9 MG/DL (ref 7–25)
CALCIUM SERPL-MCNC: 9.5 MG/DL (ref 8.6–10.2)
CCP IGG SERPL-ACNC: <16 UNITS
CHLORIDE SERPL-SCNC: 102 MMOL/L (ref 98–110)
CHOLEST SERPL-MCNC: 187 MG/DL
CHOLEST/HDLC SERPL: 1.9 (CALC)
CO2 SERPL-SCNC: 25 MMOL/L (ref 20–32)
CREAT SERPL-MCNC: 0.8 MG/DL (ref 0.5–0.99)
CRP SERPL-MCNC: <3 MG/L
EGFRCR SERPLBLD CKD-EPI 2021: 93 ML/MIN/1.73M2
EOSINOPHIL # BLD AUTO: 72 CELLS/UL (ref 15–500)
EOSINOPHIL NFR BLD AUTO: 1.6 %
ERYTHROCYTE [DISTWIDTH] IN BLOOD BY AUTOMATED COUNT: 13 % (ref 11–15)
ERYTHROCYTE [SEDIMENTATION RATE] IN BLOOD BY WESTERGREN METHOD: 2 MM/H
EST. AVERAGE GLUCOSE BLD GHB EST-MCNC: 100 MG/DL
EST. AVERAGE GLUCOSE BLD GHB EST-SCNC: 5.5 MMOL/L
FOLATE SERPL-MCNC: >24 NG/ML
GLUCOSE SERPL-MCNC: 89 MG/DL (ref 65–99)
HBA1C MFR BLD: 5.1 % OF TOTAL HGB
HCT VFR BLD AUTO: 42.8 % (ref 35–45)
HCV AB SERPL QL IA: NORMAL
HDLC SERPL-MCNC: 98 MG/DL
HGB BLD-MCNC: 14.8 G/DL (ref 11.7–15.5)
HIV 1+2 AB+HIV1 P24 AG SERPL QL IA: NORMAL
IRON SATN MFR SERPL: 49 % (CALC) (ref 16–45)
IRON SERPL-MCNC: 161 MCG/DL (ref 40–190)
LDLC SERPL CALC-MCNC: 75 MG/DL (CALC)
LYMPHOCYTES # BLD AUTO: 1098 CELLS/UL (ref 850–3900)
LYMPHOCYTES NFR BLD AUTO: 24.4 %
MCH RBC QN AUTO: 35 PG (ref 27–33)
MCHC RBC AUTO-ENTMCNC: 34.6 G/DL (ref 32–36)
MCV RBC AUTO: 101.2 FL (ref 80–100)
MONOCYTES # BLD AUTO: 572 CELLS/UL (ref 200–950)
MONOCYTES NFR BLD AUTO: 12.7 %
NEUTROPHILS # BLD AUTO: 2718 CELLS/UL (ref 1500–7800)
NEUTROPHILS NFR BLD AUTO: 60.4 %
NONHDLC SERPL-MCNC: 89 MG/DL (CALC)
PLATELET # BLD AUTO: 180 THOUSAND/UL (ref 140–400)
PMV BLD REES-ECKER: 10 FL (ref 7.5–12.5)
POTASSIUM SERPL-SCNC: 4.2 MMOL/L (ref 3.5–5.3)
PROT SERPL-MCNC: 6.8 G/DL (ref 6.1–8.1)
RBC # BLD AUTO: 4.23 MILLION/UL (ref 3.8–5.1)
RHEUMATOID FACT SERPL-ACNC: <10 IU/ML
SODIUM SERPL-SCNC: 137 MMOL/L (ref 135–146)
TIBC SERPL-MCNC: 331 MCG/DL (CALC) (ref 250–450)
TRIGL SERPL-MCNC: 59 MG/DL
TSH SERPL-ACNC: 1.18 MIU/L
VIT B12 SERPL-MCNC: 512 PG/ML (ref 200–1100)
WBC # BLD AUTO: 4.5 THOUSAND/UL (ref 3.8–10.8)

## 2025-03-18 LAB
25(OH)D3+25(OH)D2 SERPL-MCNC: 35 NG/ML (ref 30–100)
ALBUMIN SERPL-MCNC: 4.6 G/DL (ref 3.6–5.1)
ALP SERPL-CCNC: 39 U/L (ref 31–125)
ALT SERPL-CCNC: 12 U/L (ref 6–29)
ANA PAT SER IF-IMP: ABNORMAL
ANA SER QL IF: POSITIVE
ANA TITR SER IF: ABNORMAL TITER
ANION GAP SERPL CALCULATED.4IONS-SCNC: 10 MMOL/L (CALC) (ref 7–17)
AST SERPL-CCNC: 17 U/L (ref 10–35)
BASOPHILS # BLD AUTO: 41 CELLS/UL (ref 0–200)
BASOPHILS NFR BLD AUTO: 0.9 %
BILIRUB SERPL-MCNC: 1 MG/DL (ref 0.2–1.2)
BUN SERPL-MCNC: 9 MG/DL (ref 7–25)
CALCIUM SERPL-MCNC: 9.5 MG/DL (ref 8.6–10.2)
CCP IGG SERPL-ACNC: <16 UNITS
CENTROMERE B AB SER-ACNC: ABNORMAL AI
CHLORIDE SERPL-SCNC: 102 MMOL/L (ref 98–110)
CHOLEST SERPL-MCNC: 187 MG/DL
CHOLEST/HDLC SERPL: 1.9 (CALC)
CO2 SERPL-SCNC: 25 MMOL/L (ref 20–32)
CREAT SERPL-MCNC: 0.8 MG/DL (ref 0.5–0.99)
CRP SERPL-MCNC: <3 MG/L
DSDNA AB SER-ACNC: 1 IU/ML
EGFRCR SERPLBLD CKD-EPI 2021: 93 ML/MIN/1.73M2
ENA JO1 AB SER IA-ACNC: ABNORMAL AI
ENA RNP AB SER-ACNC: ABNORMAL AI
ENA SCL70 AB SER IA-ACNC: ABNORMAL AI
ENA SM AB SER IA-ACNC: ABNORMAL AI
ENA SM+RNP AB SER IA-ACNC: ABNORMAL AI
ENA SS-A AB SER IA-ACNC: ABNORMAL AI
ENA SS-B AB SER IA-ACNC: ABNORMAL AI
EOSINOPHIL # BLD AUTO: 72 CELLS/UL (ref 15–500)
EOSINOPHIL NFR BLD AUTO: 1.6 %
ERYTHROCYTE [DISTWIDTH] IN BLOOD BY AUTOMATED COUNT: 13 % (ref 11–15)
ERYTHROCYTE [SEDIMENTATION RATE] IN BLOOD BY WESTERGREN METHOD: 2 MM/H
EST. AVERAGE GLUCOSE BLD GHB EST-MCNC: 100 MG/DL
EST. AVERAGE GLUCOSE BLD GHB EST-SCNC: 5.5 MMOL/L
FOLATE SERPL-MCNC: >24 NG/ML
GLUCOSE SERPL-MCNC: 89 MG/DL (ref 65–99)
HBA1C MFR BLD: 5.1 % OF TOTAL HGB
HCT VFR BLD AUTO: 42.8 % (ref 35–45)
HCV AB SERPL QL IA: NORMAL
HDLC SERPL-MCNC: 98 MG/DL
HGB BLD-MCNC: 14.8 G/DL (ref 11.7–15.5)
HIV 1+2 AB+HIV1 P24 AG SERPL QL IA: NORMAL
IRON SATN MFR SERPL: 49 % (CALC) (ref 16–45)
IRON SERPL-MCNC: 161 MCG/DL (ref 40–190)
LABORATORY COMMENT REPORT: ABNORMAL
LDLC SERPL CALC-MCNC: 75 MG/DL (CALC)
LYMPHOCYTES # BLD AUTO: 1098 CELLS/UL (ref 850–3900)
LYMPHOCYTES NFR BLD AUTO: 24.4 %
MCH RBC QN AUTO: 35 PG (ref 27–33)
MCHC RBC AUTO-ENTMCNC: 34.6 G/DL (ref 32–36)
MCV RBC AUTO: 101.2 FL (ref 80–100)
MONOCYTES # BLD AUTO: 572 CELLS/UL (ref 200–950)
MONOCYTES NFR BLD AUTO: 12.7 %
NEUTROPHILS # BLD AUTO: 2718 CELLS/UL (ref 1500–7800)
NEUTROPHILS NFR BLD AUTO: 60.4 %
NONHDLC SERPL-MCNC: 89 MG/DL (CALC)
NUCLEOSOME AB SER IA-ACNC: ABNORMAL AI
PLATELET # BLD AUTO: 180 THOUSAND/UL (ref 140–400)
PMV BLD REES-ECKER: 10 FL (ref 7.5–12.5)
POTASSIUM SERPL-SCNC: 4.2 MMOL/L (ref 3.5–5.3)
PROT SERPL-MCNC: 6.8 G/DL (ref 6.1–8.1)
RBC # BLD AUTO: 4.23 MILLION/UL (ref 3.8–5.1)
RHEUMATOID FACT SERPL-ACNC: <10 IU/ML
RIBOSOMAL P AB SER-ACNC: ABNORMAL AI
SODIUM SERPL-SCNC: 137 MMOL/L (ref 135–146)
TIBC SERPL-MCNC: 331 MCG/DL (CALC) (ref 250–450)
TRIGL SERPL-MCNC: 59 MG/DL
TSH SERPL-ACNC: 1.18 MIU/L
VIT B12 SERPL-MCNC: 512 PG/ML (ref 200–1100)
WBC # BLD AUTO: 4.5 THOUSAND/UL (ref 3.8–10.8)

## 2025-03-26 ENCOUNTER — EVALUATION (OUTPATIENT)
Dept: PHYSICAL THERAPY | Facility: CLINIC | Age: 45
End: 2025-03-26
Payer: COMMERCIAL

## 2025-03-26 DIAGNOSIS — M62.830 LUMBAR PARASPINAL MUSCLE SPASM: ICD-10-CM

## 2025-03-26 PROCEDURE — 97162 PT EVAL MOD COMPLEX 30 MIN: CPT | Mod: GP

## 2025-03-26 PROCEDURE — 97110 THERAPEUTIC EXERCISES: CPT | Mod: GP

## 2025-03-26 SDOH — ECONOMIC STABILITY: GENERAL: QUALITY OF LIFE: FAIR

## 2025-03-26 ASSESSMENT — ENCOUNTER SYMPTOMS
ALLEVIATING FACTORS: MEDICATIONS
EXACERBATED BY: LIFTING
DEPRESSION: 0
PAIN SCALE AT HIGHEST: 9
ALLEVIATING FACTORS: HEAT
EXACERBATED BY: OVERHEAD ACTIVITY
PAIN SCALE AT LOWEST: 0
ALLEVIATING FACTORS: RELAXATION
QUALITY: TIGHT
QUALITY: SQUEEZING
QUALITY: DISCOMFORT
EXACERBATED BY: MOVEMENT
QUALITY: PRESSURE
ALLEVIATING FACTORS: SUPPORT
QUALITY: RADIATING
OCCASIONAL FEELINGS OF UNSTEADINESS: 0
ALLEVIATING FACTORS: CHANGE IN POSITION
ALLEVIATING FACTORS: REST
QUALITY: CRAMPING
LOSS OF SENSATION IN FEET: 0
PAIN SCALE: 0

## 2025-03-26 NOTE — PROGRESS NOTES
"Physical Therapy Evaluation and Treatment     Patient Name: Shy Osorio  MRN: [id number]  Encounter date: 3/26/2025  Time Calculation  Start Time: 1300  Stop Time: 1345  Time Calculation (min): 45 min    Visit # 1 of 30  Visits/Dates Authorized: 25 [id number] COVERAGE OOP 4000 30 V A YR (DRY NEEDLE COVERED )REF # [id number]    Current Problem:   Problem List Items Addressed This Visit    None  Visit Diagnoses         Codes    Lumbar paraspinal muscle spasm     M62.830    Relevant Orders    Follow Up In Physical Therapy          Precautions: None          Subjective Evaluation    History of Present Illness  Date of onset: 2025  Mechanism of injury: C/C - muscle spasms thoracic/rib cage - \"it's happened 2x\"  Insidious onset, denies injury  History -     Quality of life: fair    Pain  Current pain ratin  At best pain ratin  At worst pain ratin  Location: spasm rib cage/thoracic  Quality: cramping, radiating, tight, pressure, discomfort and squeezing  Relieving factors: change in position, heat, medications, relaxation, rest and support  Aggravating factors: lifting, movement and overhead activity  Progression: no change    Social Support  Lives in: multiple-level home    Diagnostic Tests  No diagnostic tests performed    Treatments  Previous treatment: medication  Current treatment: medication  Patient Goals  Patient goals for therapy: decreased pain, increased motion, increased strength, independence with ADLs/IADLs and return to sport/leisure activities  Patient 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     Head  Forward, rotated right and tilted right.    Cervical Spine  Tilted right and rotated right.    Shoulders  Asymmetric shoulders, elevated and rounded.    Scapulae  Left winging, right winging and right elevated.    Thoracic Spine  Concave curve left, convex curve " "right and flattened thoracic spine.    Rib Cage  Elevated and rib hump.    Lumbar Spine   Flattened and decreased lordosis.   Lumbar spine (Right): Convex curve.      Pelvis   Posterior pelvic tilt    Hip   Hip (Left): Externally rotated.   Hip (Right): Externally rotated.     Knee   Genu valgus.      Other Outcome Measures:   ZACHARY - 4    Treatments:   Therapeutic Exercise 63133:   Modified royer R/L x 3min    HEP / Access Codes:   Access Code: [email or web address]  URL: [email or web address]  Date: 03/26/2025  Prepared by: Tripp Vail    Exercises  - Seated Scapular Retraction  - 1 x daily - 7 x weekly - 1 sets - 5 reps - 3 hold  - Seated Cervical Retraction  - 1 x daily - 7 x weekly - 1 sets - 5 reps - 3 hold  - Seated Upper Trapezius Stretch  - 1 x daily - 7 x weekly - 1 sets - 2 reps - 10 hold  - Doorway Pec Stretch at 90 Degrees Abduction  - 1 x daily - 7 x weekly - 1 sets - 2 reps - 10 hold  - Supine Piriformis Stretch with Foot on Ground  - 1 x daily - 7 x weekly - 1 sets - 1 reps - 10 hold  - Seated Hamstring Stretch  - 1 x daily - 7 x weekly - 1 sets - 1 reps - 10 hold  - Supine Hamstring Stretch with Strap  - 1 x daily - 7 x weekly - 1 sets - 1 reps - 10 hold  - Modified Royer Stretch  - 1 x daily - 7 x weekly - 1 sets - 1 reps - 180 hold  - Cat Cow to Child's Pose  - 1 x daily - 7 x weekly - 1 sets - 10 reps - 3 hold  - Quadruped Thoracic Rotation - Reach Under  - 1 x daily - 7 x weekly - 1 sets - 10 reps - 3 hold    Assessment & Plan     Assessment  Impairments: abnormal muscle tone, abnormal or restricted ROM, activity intolerance, impaired physical strength, lacks appropriate home exercise program and pain with function  Prognosis: good    Goals  Not working - \"mom\", Hobbies - Yoga/pilates    Plan  Therapy options: will be seen for skilled physical therapy services  Other planned modality interventions: TDN  Planned therapy interventions: abdominal trunk stabilization, body mechanics " training, flexibility, functional ROM exercises, home exercise program, joint mobilization, manual therapy, neuromuscular re-education, postural training, soft tissue mobilization, spinal/joint mobilization, strengthening, stretching and therapeutic activities  Frequency: 2x week  Duration in visits: 8  Duration in weeks: 4  Treatment plan discussed with: patient      OP PT Plan  Treatment/Interventions: Dry needling, Education/ Instruction, Electrical stimulation, Manual therapy, Neuromuscular re-education, Taping techniques, Therapeutic activities, Therapeutic exercises  PT Plan: Skilled PT  PT Frequency: 2 times per week  Duration: 5weeks  Number of Treatments Authorized: 1/30  Rehab Potential: Excellent  Plan of Care Agreement: Patient   Moderate complexity due to patient's clinical presentation being evolving with changing characteristics, with comorbidities/complexities to include chronic thoracic muscle spasms, all of which may negatively impact rehab tolerance and progression.     SUMMARY -    Manual - passive - T/L, Hips, Pec, cerv-UT, hips  PROM - hip IR (pirif release and psoas release)  Jt mobs - Thoracic/lumbar - PA  IASTM - thoracic/lumbar  MFR - C/T   PRE's - posture/core - Sball program  Recommend - posture support/brace - PRN  PRN - K-tape, TPDN - Thoracic/hips/psoas/pec/UT/Lev/C-T paraspinals    Goals: Frequency - 1-2x/wk x 4-5 wks    Goals:  SHORT TERM GOALS:  Patient will report decrease in pain from 8/10 to </= 1/10 to improve quality of life.   Patient will improve thoracic/lumbar/hips AROM to WFL in order to improve gait mechanics and functional mobility  Patient will demonstrate sit to stand x10 without UE to demonstrate improved LE strength.  Patient will improve 0 score to </= 10 seconds to reduce fall risk.   Patient independent with prescribed HEP  Pt Education - Independent postural and  awareness and joint protection program  LONG TERM GOALS:  Patient will be independent  with HEP to promote self management of condition  Patient will perform reciprocal stair negotiation to demonstrate improved LE strength.   Patient will ambulate with least restrictive device and proper gait mechanics to promote return to prior level of function.    Functional Level - reported % (hobbies/work/recreation)

## 2025-03-27 ENCOUNTER — HOSPITAL ENCOUNTER (OUTPATIENT)
Dept: RADIOLOGY | Facility: HOSPITAL | Age: 45
Discharge: HOME | End: 2025-03-27
Payer: COMMERCIAL

## 2025-03-27 DIAGNOSIS — R92.8 OTHER ABNORMAL AND INCONCLUSIVE FINDINGS ON DIAGNOSTIC IMAGING OF BREAST: ICD-10-CM

## 2025-03-27 PROCEDURE — 77065 DX MAMMO INCL CAD UNI: CPT | Mod: LT

## 2025-04-02 ENCOUNTER — PROCEDURE VISIT (OUTPATIENT)
Dept: OBSTETRICS AND GYNECOLOGY | Facility: CLINIC | Age: 45
End: 2025-04-02
Payer: COMMERCIAL

## 2025-04-02 ENCOUNTER — APPOINTMENT (OUTPATIENT)
Dept: OBSTETRICS AND GYNECOLOGY | Facility: CLINIC | Age: 45
End: 2025-04-02
Payer: COMMERCIAL

## 2025-04-02 VITALS
DIASTOLIC BLOOD PRESSURE: 78 MMHG | HEIGHT: 65 IN | WEIGHT: 127 LBS | SYSTOLIC BLOOD PRESSURE: 124 MMHG | BODY MASS INDEX: 21.16 KG/M2

## 2025-04-02 DIAGNOSIS — N84.1 CERVICAL POLYP: Primary | ICD-10-CM

## 2025-04-02 PROCEDURE — 57500 BIOPSY OF CERVIX: CPT | Performed by: OBSTETRICS & GYNECOLOGY

## 2025-04-02 ASSESSMENT — LIFESTYLE VARIABLES
HOW MANY STANDARD DRINKS CONTAINING ALCOHOL DO YOU HAVE ON A TYPICAL DAY: 1 OR 2
SKIP TO QUESTIONS 9-10: 1
HOW OFTEN DO YOU HAVE A DRINK CONTAINING ALCOHOL: 2-4 TIMES A MONTH
AUDIT-C TOTAL SCORE: 2
HOW OFTEN DO YOU HAVE SIX OR MORE DRINKS ON ONE OCCASION: NEVER

## 2025-04-02 ASSESSMENT — ENCOUNTER SYMPTOMS
OCCASIONAL FEELINGS OF UNSTEADINESS: 0
LOSS OF SENSATION IN FEET: 0
DEPRESSION: 0

## 2025-04-02 ASSESSMENT — PATIENT HEALTH QUESTIONNAIRE - PHQ9
SUM OF ALL RESPONSES TO PHQ9 QUESTIONS 1 & 2: 0
2. FEELING DOWN, DEPRESSED OR HOPELESS: NOT AT ALL
1. LITTLE INTEREST OR PLEASURE IN DOING THINGS: NOT AT ALL

## 2025-04-02 ASSESSMENT — PAIN SCALES - GENERAL: PAINLEVEL_OUTOF10: 0-NO PAIN

## 2025-04-02 NOTE — PROGRESS NOTES
Colposcopy    Date/Time: 4/2/2025 11:36 AM    Performed by: Vi Tripp MD  Authorized by: Vi Tripp MD    Procedure location: cervix    Consent:     Patient questions answered: yes      Risks and benefits of the procedure and its alternatives discussed: yes      Consent obtained:  Verbal    Consent given by:  Patient  Pre-procedure:     Speculum was placed in the vagina: yes    Procedure:     Colposcopy with: cervical biopsy      Colposcopy details:  Cervical polyp    Cervix visibility: fully visualized      Cervical impression: normal/benign    Post-procedure:     Patient tolerance of procedure:  Patient tolerated the procedure well with no immediate complications

## 2025-04-03 ENCOUNTER — APPOINTMENT (OUTPATIENT)
Dept: PHYSICAL THERAPY | Facility: CLINIC | Age: 45
End: 2025-04-03
Payer: COMMERCIAL

## 2025-04-09 ENCOUNTER — APPOINTMENT (OUTPATIENT)
Dept: PHYSICAL THERAPY | Facility: CLINIC | Age: 45
End: 2025-04-09
Payer: COMMERCIAL

## 2025-04-09 LAB
LABORATORY COMMENT REPORT: NORMAL
PATH REPORT.FINAL DX SPEC: NORMAL
PATH REPORT.GROSS SPEC: NORMAL
PATH REPORT.RELEVANT HX SPEC: NORMAL
PATH REPORT.TOTAL CANCER: NORMAL

## 2025-04-14 ENCOUNTER — TREATMENT (OUTPATIENT)
Dept: PHYSICAL THERAPY | Facility: CLINIC | Age: 45
End: 2025-04-14
Payer: COMMERCIAL

## 2025-04-14 DIAGNOSIS — M62.830 LUMBAR PARASPINAL MUSCLE SPASM: Primary | ICD-10-CM

## 2025-04-14 PROCEDURE — 97140 MANUAL THERAPY 1/> REGIONS: CPT | Mod: GP,CQ

## 2025-04-14 PROCEDURE — 97110 THERAPEUTIC EXERCISES: CPT | Mod: GP,CQ

## 2025-04-14 ASSESSMENT — PAIN SCALES - GENERAL: PAINLEVEL_OUTOF10: 3

## 2025-04-14 ASSESSMENT — PAIN - FUNCTIONAL ASSESSMENT: PAIN_FUNCTIONAL_ASSESSMENT: 0-10

## 2025-04-14 NOTE — PROGRESS NOTES
Physical Therapy Treatment    Patient Name: Shy Osorio  MRN: [id number]  Today's Date: 4/14/2025    Current Problem  Problem List Items Addressed This Visit             ICD-10-CM    Lumbar paraspinal muscle spasm - Primary M62.830       Insurance:  Payor: [employer] / Plan: [employer] / Product Type: *No Product type* /   Number of Treatments Authorized: 2/30          Subjective   General  Reason for Referral: T/L pain  Referred By: Ange  General Comment: PT STATES SHE IS HAVING MORE PAIN IN HER NECK AND PIRIFORMIS THIS MORNING.    Performing HEP?: Yes    Precautions  Precautions  Precautions Comment: NONE  Pain  Pain Assessment: 0-10  0-10 (Numeric) Pain Score: 3  Pain Location: Neck (PIRIFORMIS)    Objective   General Observation  General Observation: FWD HEAD    Treatments:    Therapeutic Exercise  Therapeutic Exercise Activity 1: SCIFIT UE F/B MAN L2 X 6 MIN  Therapeutic Exercise Activity 2: PEC STRETCH MID X 1 MIN  Therapeutic Exercise Activity 3: CERV RETRACTION X 1 MIN         Manual Therapy  Manual Therapy Activity 1: MFR SOR, CRANIAL PULL  Manual Therapy Activity 2: CERV - THOR - LUMBAR MOBS PA GRADE 2,3  Manual Therapy Activity 3: STM/TPR: UT, SCALENES, LEV SCAP, C-T-L PARA, SCM, GLUT, PIRIF  Manual Therapy Activity 4: CERV ROM SB, ROT  Manual Therapy Activity 5: STRETCH UT, SCALENES                        OP EDUCATION:  Outpatient Education  Education Comment: CONTINUE WITH CURRENT HEP    Assessment:  PT Assessment  Assessment Comment: PT PAUL SESSION FAIRLY WELL.  SHE IS VERY TIGHT AND TENDER IN HER CERV MUSCLES, T-L PARA, GLUT AND PIRIF.  PT FELT LOOSER AND HAD LESS PAIN AFTER SESSION.    Plan:  OP PT Plan  Treatment/Interventions: Dry needling, Education/ Instruction, Electrical stimulation, Manual therapy, Neuromuscular re-education, Taping techniques, Therapeutic activities, Therapeutic exercises  PT Plan: Skilled PT (INITIATE SCAP AND CORE STRENGTHENING NEXT VISIT)  PT Frequency: 2 times per  week  Duration: 5weeks  Number of Treatments Authorized: 2/30  Rehab Potential: Excellent  Plan of Care Agreement: Patient    Goals:       Time Calculation  Start Time: 0950  Stop Time: 1034  Time Calculation (min): 44 min  PT Therapeutic Procedures Time Entry  Manual Therapy Time Entry: 36  Therapeutic Exercise Time Entry: 8,

## 2025-04-16 ENCOUNTER — APPOINTMENT (OUTPATIENT)
Dept: PHYSICAL THERAPY | Facility: CLINIC | Age: 45
End: 2025-04-16
Payer: COMMERCIAL

## 2025-04-30 ENCOUNTER — TREATMENT (OUTPATIENT)
Dept: PHYSICAL THERAPY | Facility: CLINIC | Age: 45
End: 2025-04-30
Payer: COMMERCIAL

## 2025-04-30 ENCOUNTER — APPOINTMENT (OUTPATIENT)
Dept: PHYSICAL THERAPY | Facility: CLINIC | Age: 45
End: 2025-04-30
Payer: COMMERCIAL

## 2025-04-30 DIAGNOSIS — M62.830 LUMBAR PARASPINAL MUSCLE SPASM: ICD-10-CM

## 2025-04-30 PROCEDURE — 97140 MANUAL THERAPY 1/> REGIONS: CPT | Mod: GP

## 2025-04-30 PROCEDURE — 97110 THERAPEUTIC EXERCISES: CPT | Mod: GP

## 2025-04-30 ASSESSMENT — PAIN SCALES - GENERAL: PAINLEVEL_OUTOF10: 5 - MODERATE PAIN

## 2025-04-30 ASSESSMENT — PAIN - FUNCTIONAL ASSESSMENT: PAIN_FUNCTIONAL_ASSESSMENT: 0-10

## 2025-04-30 NOTE — PROGRESS NOTES
Physical Therapy Treatment    Patient Name: Shy Osorio  MRN: [id number]  Today's Date: 4/30/2025  Time Calculation  Start Time: 1100  Stop Time: 1145  Time Calculation (min): 45 min  PT Therapeutic Procedures Time Entry  Manual Therapy Time Entry: 15  Therapeutic Exercise Time Entry: 30    Insurance:  Visit number: Number of Treatments Authorized: 3/30  Authorization info: 25 [id number] COVERAGE OOP 4000 30 V A YR (DRY NEEDLE COVERED )REF # [id number]  Insurance Type: Payor: [employer] / Plan: [employer] / Product Type: *No Product type* /     Current Problem   1. Lumbar paraspinal muscle spasm  Follow Up In Physical Therapy          Subjective   General   Reason for Referral: T/L pain  Referred By: Ange  General Comment: PT STATES SHE IS HAVING MORE PAIN IN HER NECK AND PIRIFORMIS THIS MORNING.  Precautions:  Precautions  Precautions Comment: NONE  Pain   Pain Assessment: 0-10  0-10 (Numeric) Pain Score: 5 - Moderate pain  Pain Type: Chronic pain  Pain Location: Back  Pain Orientation: Left  Post Treatment Pain Level 4/10    Objective     Treatments:  Therapeutic Exercise:  Therapeutic Exercise  Therapeutic Exercise Activity 1: SCIFIT UE F/B MAN L2 X 6 MIN  Therapeutic Exercise Activity 2: PEC STRETCH MID X 1 MIN  Therapeutic Exercise Activity 3: CERV RETRACTION X 1 MIN  Therapeutic Exercise Activity 4: 65cm - rollouts center/R/L x 3min  Therapeutic Exercise Activity 5: 65 cm - bouncing/PPT/APT 2min  Therapeutic Exercise Activity 6: dynamics - scoops/BW TD/fig 4/HTB x10min  Therapeutic Exercise Activity 7: modified benji with stretch strap x2min  Manual:  Manual Therapy  Manual Therapy Performed: Yes  Manual Therapy Activity 1: Passive - R/L HS/glutes/psoas/quad/pirif  Manual Therapy Activity 2: TRPR Left pirif/glute    Assessment   Assessment:   PT Assessment  Assessment Comment: PT PAUL SESSION FAIRLY WELL.  SHE IS VERY TIGHT AND TENDER IN HER CERV MUSCLES, T-L PARA, GLUT AND PIRIF.  PT FELT LOOSER AND HAD  LESS PAIN AFTER SESSION.    Plan:   OP PT Plan  Treatment/Interventions: Dry needling, Education/ Instruction, Electrical stimulation, Manual therapy, Neuromuscular re-education, Taping techniques, Therapeutic activities, Therapeutic exercises  PT Plan: Skilled PT (INITIATE SCAP AND CORE STRENGTHENING NEXT VISIT)  PT Frequency: 2 times per week  Duration: 5weeks  Number of Treatments Authorized: 3/30  Rehab Potential: Excellent  Plan of Care Agreement: Patient  Hold modified benji for now  Add self TRPR - glute and piriformis  Dynamic - scoops/fig 4/BL TD    OP EDUCATION:  Outpatient Education  Education Comment: CONTINUE WITH CURRENT HEP    Goals:

## 2025-05-05 ENCOUNTER — TREATMENT (OUTPATIENT)
Dept: PHYSICAL THERAPY | Facility: CLINIC | Age: 45
End: 2025-05-05
Payer: COMMERCIAL

## 2025-05-05 ENCOUNTER — APPOINTMENT (OUTPATIENT)
Dept: PHYSICAL THERAPY | Facility: CLINIC | Age: 45
End: 2025-05-05
Payer: COMMERCIAL

## 2025-05-05 DIAGNOSIS — M62.830 LUMBAR PARASPINAL MUSCLE SPASM: ICD-10-CM

## 2025-05-05 PROCEDURE — 97140 MANUAL THERAPY 1/> REGIONS: CPT | Mod: GP

## 2025-05-05 PROCEDURE — 97110 THERAPEUTIC EXERCISES: CPT | Mod: GP

## 2025-05-05 ASSESSMENT — PAIN SCALES - GENERAL: PAINLEVEL_OUTOF10: 5 - MODERATE PAIN

## 2025-05-05 ASSESSMENT — PAIN - FUNCTIONAL ASSESSMENT: PAIN_FUNCTIONAL_ASSESSMENT: 0-10

## 2025-05-05 NOTE — PROGRESS NOTES
Physical Therapy Treatment    Patient Name: Shy Osorio  MRN: [id number]  Today's Date: 5/5/2025  Time Calculation  Start Time: 1015  Stop Time: 1100  Time Calculation (min): 45 min  PT Therapeutic Procedures Time Entry  Manual Therapy Time Entry: 15  Therapeutic Exercise Time Entry: 30    Insurance:  Visit number: Number of Treatments Authorized: 4/30  Authorization info: 25 [id number] COVERAGE OOP 4000 30 V A YR (DRY NEEDLE COVERED )REF # [id number]  Insurance Type: Payor: [employer] / Plan: [employer] / Product Type: *No Product type* /     Current Problem   1. Lumbar paraspinal muscle spasm  Follow Up In Physical Therapy          Subjective   General   Notes mild relief following last OV - pirif TPR.  Reason for Referral: T/L pain  Referred By: Ange  General Comment: PT STATES SHE IS HAVING MORE PAIN IN HER NECK AND PIRIFORMIS THIS MORNING.  Precautions:  Precautions  Precautions Comment: NONE  Pain   Pain Assessment: 0-10  0-10 (Numeric) Pain Score: 5 - Moderate pain  Pain Type: Chronic pain  Pain Location: Back  Pain Orientation: Left  Post Treatment Pain Level 4/10    Objective       Treatments:  Therapeutic Exercise:  Therapeutic Exercise  Therapeutic Exercise Activity 1: scifit - LE x5min L5  Therapeutic Exercise Activity 2: PEC STRETCH MID X 1 MIN  Therapeutic Exercise Activity 3: CERV RETRACTION X 1 MIN  Therapeutic Exercise Activity 4: 65cm - rollouts center/R/L x 3min  Therapeutic Exercise Activity 5: 65 cm - bouncing/PPT/APT 2min  Therapeutic Exercise Activity 6: dynamics - scoops/BW TD/fig 4/HTB x10min  Therapeutic Exercise Activity 7: modified benji with stretch strap x2min  Therapeutic Exercise Activity 8: bosu med R/L lat x 4  Therapeutic Exercise Activity 9: bosu-med fwd/retrox4  Therapeutic Exercise Activity 10: bosu med - R/L hip abdx 20  Therapeutic Exercise Activity 11: bosu med - r/l hip flex x 20     Manual:  Manual Therapy  Manual Therapy Performed: Yes  Manual Therapy Activity 1:  passive - R/L hs/glutes/psoas/quad/pirif  Manual Therapy Activity 2: TRPR - Left pirif/glute x 10min  Assessment   Assessment:   PT Assessment  Assessment Comment: Pt rima all acivities. Reduced trpf s/p manual release glutes/pirif    Plan:   OP PT Plan  Treatment/Interventions: Dry needling, Education/ Instruction, Electrical stimulation, Manual therapy, Neuromuscular re-education, Taping techniques, Therapeutic activities, Therapeutic exercises  PT Plan: Skilled PT (INITIATE SCAP AND CORE STRENGTHENING NEXT VISIT)  PT Frequency: 2 times per week  Duration: 5weeks  Number of Treatments Authorized: 4/30  Rehab Potential: Excellent  Plan of Care Agreement: Patient    OP EDUCATION:  Outpatient Education  Education Comment: CONTINUE WITH CURRENT HEP

## 2025-05-08 ENCOUNTER — HOSPITAL ENCOUNTER (OUTPATIENT)
Dept: RADIOLOGY | Facility: CLINIC | Age: 45
Discharge: HOME | End: 2025-05-08
Payer: COMMERCIAL

## 2025-05-08 ENCOUNTER — OFFICE VISIT (OUTPATIENT)
Dept: PRIMARY CARE | Facility: CLINIC | Age: 45
End: 2025-05-08
Payer: COMMERCIAL

## 2025-05-08 VITALS
DIASTOLIC BLOOD PRESSURE: 72 MMHG | WEIGHT: 126 LBS | BODY MASS INDEX: 20.99 KG/M2 | SYSTOLIC BLOOD PRESSURE: 124 MMHG | HEIGHT: 65 IN

## 2025-05-08 DIAGNOSIS — Z91.09 ENVIRONMENTAL ALLERGIES: Primary | ICD-10-CM

## 2025-05-08 DIAGNOSIS — M54.42 CHRONIC LEFT-SIDED LOW BACK PAIN WITH LEFT-SIDED SCIATICA: ICD-10-CM

## 2025-05-08 DIAGNOSIS — R76.8 POSITIVE ANA (ANTINUCLEAR ANTIBODY): ICD-10-CM

## 2025-05-08 DIAGNOSIS — G89.29 CHRONIC LEFT-SIDED LOW BACK PAIN WITH LEFT-SIDED SCIATICA: ICD-10-CM

## 2025-05-08 DIAGNOSIS — I73.00 RAYNAUD'S PHENOMENON WITHOUT GANGRENE: ICD-10-CM

## 2025-05-08 PROBLEM — H02.889 MGD (MEIBOMIAN GLAND DISEASE): Status: ACTIVE | Noted: 2025-05-08

## 2025-05-08 PROBLEM — N60.12 DIFFUSE CYSTIC MASTOPATHY OF LEFT BREAST: Status: ACTIVE | Noted: 2025-05-08

## 2025-05-08 PROBLEM — H04.123 BILATERAL DRY EYES: Status: ACTIVE | Noted: 2025-05-08

## 2025-05-08 PROBLEM — I73.81 ERYTHROMELALGIA: Status: ACTIVE | Noted: 2025-05-08

## 2025-05-08 PROBLEM — H10.13 ALLERGIC CONJUNCTIVITIS OF BOTH EYES: Status: ACTIVE | Noted: 2025-05-08

## 2025-05-08 PROBLEM — N91.2 AMENORRHEA: Status: ACTIVE | Noted: 2025-05-08

## 2025-05-08 PROBLEM — J30.9 ALLERGIC RHINITIS: Status: ACTIVE | Noted: 2025-05-08

## 2025-05-08 PROCEDURE — 1036F TOBACCO NON-USER: CPT | Performed by: FAMILY MEDICINE

## 2025-05-08 PROCEDURE — 3008F BODY MASS INDEX DOCD: CPT | Performed by: FAMILY MEDICINE

## 2025-05-08 PROCEDURE — 72120 X-RAY BEND ONLY L-S SPINE: CPT

## 2025-05-08 PROCEDURE — 99214 OFFICE O/P EST MOD 30 MIN: CPT | Performed by: FAMILY MEDICINE

## 2025-05-08 RX ORDER — PREDNISONE 20 MG/1
TABLET ORAL
Qty: 18 TABLET | Refills: 0 | Status: SHIPPED | OUTPATIENT
Start: 2025-05-08 | End: 2025-05-17

## 2025-05-08 RX ORDER — CETIRIZINE HYDROCHLORIDE 10 MG/1
10 TABLET ORAL DAILY
Qty: 30 TABLET | Refills: 5 | Status: SHIPPED | OUTPATIENT
Start: 2025-05-08 | End: 2025-11-04

## 2025-05-08 RX ORDER — AZELASTINE 1 MG/ML
1 SPRAY, METERED NASAL 2 TIMES DAILY
Qty: 30 ML | Refills: 5 | Status: SHIPPED | OUTPATIENT
Start: 2025-05-08 | End: 2026-05-08

## 2025-05-08 RX ORDER — FLUTICASONE PROPIONATE 50 MCG
1 SPRAY, SUSPENSION (ML) NASAL DAILY
Qty: 16 G | Refills: 5 | Status: SHIPPED | OUTPATIENT
Start: 2025-05-08 | End: 2026-05-08

## 2025-05-08 RX ORDER — MONTELUKAST SODIUM 10 MG/1
10 TABLET ORAL NIGHTLY
Qty: 30 TABLET | Refills: 5 | Status: SHIPPED | OUTPATIENT
Start: 2025-05-08 | End: 2025-11-04

## 2025-05-08 ASSESSMENT — ENCOUNTER SYMPTOMS
OCCASIONAL FEELINGS OF UNSTEADINESS: 0
LOSS OF SENSATION IN FEET: 0
DEPRESSION: 0

## 2025-05-08 ASSESSMENT — COLUMBIA-SUICIDE SEVERITY RATING SCALE - C-SSRS
2. HAVE YOU ACTUALLY HAD ANY THOUGHTS OF KILLING YOURSELF?: NO
1. IN THE PAST MONTH, HAVE YOU WISHED YOU WERE DEAD OR WISHED YOU COULD GO TO SLEEP AND NOT WAKE UP?: NO
6. HAVE YOU EVER DONE ANYTHING, STARTED TO DO ANYTHING, OR PREPARED TO DO ANYTHING TO END YOUR LIFE?: NO

## 2025-05-08 ASSESSMENT — PAIN SCALES - GENERAL: PAINLEVEL_OUTOF10: 0-NO PAIN

## 2025-05-08 NOTE — PROGRESS NOTES
44-year-old presents to clinic for follow-up new complaints    1. Environmental allergies    2. Chronic left-sided low back pain with left-sided sciatica    3. Raynaud's phenomenon without gangrene    4. Positive ANABELLA (antinuclear antibody)      Allergies: Patient trialed oral antihistamines and Flonase but has noticed that her allergies are worsening this season.  Likely due to [place] as her neighbors experience similar allergies that got better when they moved away.  Typically associated with rhinorrhea and facial pain      Back pain: Has been doing physical therapy regularly is not noticing a significant reduction in the pain of her low back.  Is experiencing radiating symptoms including numbness and tingling down her left lower extremity.    Raynaud's/positive ANABELLA:  Recent autoimmune/rheumatologic workup did reveal a very low positive ANABELLA in the setting of Raynaud's.  Patient notices symptoms improve when the warmer weather hits.  Other workup was negative      All pertinent positive symptoms are included in history of present illness.    All other systems have been reviewed and are negative and noncontributory to this patient's current ailments.      CONSTITUTIONAL - INAD. Not ill appearing.  SKIN - No lesions or rashes visualized. No jaundice visualized.  HEENT- Atraumatic, normocephalic, no scleral icterus, external nares are not erythematous and without drainage, no neck masses visualized, oropharynx visualized and is without erythema or exudate  RESP - respiration not labored   CARDIAC - no grade 6 systolic murmurs auscultated  ABDOMEN - nondistended.  NEURO- CNs II-XII grossly intact      1. Environmental allergies (Primary)  The natural history and course of allergies was discussed at length with the patient.  We discussed different diagnostic modalities including RAST and patch testing as well as monitoring parameters.  Spoke about the need for treatment of allergies and prevention of  allergic reactions.  Spoke about the avoidance of triggers such as environmental allergens.  Spoke with the need to control environment including utilizing good filtration like HEPA filters at home as well as utilizing allergy medications as necessary to prevent exacerbations.  Spoke about the different treatments for allergies including intranasal corticosteroids, intranasal antihistamines, oral antihistamines, oral steroids, oral leukotriene antagonists as well as different side effects of these medications that could be expected.  If the allergy is anaphylactic, spoke about the use of epinephrine injectors and the need to have one with them at all times.  Spoke about the correct way to use these medications.  Spoke about the red flag signs and symptoms to look out for in terms of allergy exacerbations and when to go to the emergency department.  Depending on the severity of the allergy, the appropriate diagnostic and treatment protocol was prescribed for the patient and the patient made an informed decision about the treatment protocol at this time.  Patient was informed of the appropriate follow-up time for their condition.  - predniSONE (Deltasone) 20 mg tablet; Take 3 tablets (60 mg) by mouth once daily for 3 days, THEN 2 tablets (40 mg) once daily for 3 days, THEN 1 tablet (20 mg) once daily for 3 days.  Dispense: 18 tablet; Refill: 0  - montelukast (Singulair) 10 mg tablet; Take 1 tablet (10 mg) by mouth once daily at bedtime.  Dispense: 30 tablet; Refill: 5  - cetirizine (ZyrTEC) 10 mg tablet; Take 1 tablet (10 mg) by mouth once daily.  Dispense: 30 tablet; Refill: 5  - azelastine (Astelin) 137 mcg (0.1 %) nasal spray; Administer 1 spray into each nostril 2 times a day. Use in each nostril as directed  Dispense: 30 mL; Refill: 5  - fluticasone (Flonase) 50 mcg/actuation nasal spray; Administer 1 spray into each nostril once daily. Shake gently. Before first use, prime pump. After use, clean tip and replace  cap.  Dispense: 16 g; Refill: 5    2. Chronic left-sided low back pain with left-sided sciatica  Trial prednisone burst get x-ray of the spine referral to spine medicine likely sciatica concerning for lumbar radiculopathy  - XR lumbar spine 4+ views w flexion extension; Future  - predniSONE (Deltasone) 20 mg tablet; Take 3 tablets (60 mg) by mouth once daily for 3 days, THEN 2 tablets (40 mg) once daily for 3 days, THEN 1 tablet (20 mg) once daily for 3 days.  Dispense: 18 tablet; Refill: 0  - Referral to Medical Spine; Future    3. Raynaud's phenomenon without gangrene  Talk to patient by use of calcium channel blockers refer to rheumatology  - Referral to Rheumatology; Future    4. Positive ANABELLA (antinuclear antibody)  Very likely low positive false positive but refer to rheumatology in setting of Raynaud's joint pain  - Referral to Rheumatology; Future

## 2025-05-14 ENCOUNTER — TREATMENT (OUTPATIENT)
Dept: PHYSICAL THERAPY | Facility: CLINIC | Age: 45
End: 2025-05-14
Payer: COMMERCIAL

## 2025-05-14 DIAGNOSIS — M62.830 LUMBAR PARASPINAL MUSCLE SPASM: ICD-10-CM

## 2025-05-14 PROCEDURE — 97140 MANUAL THERAPY 1/> REGIONS: CPT | Mod: GP

## 2025-05-14 PROCEDURE — 97110 THERAPEUTIC EXERCISES: CPT | Mod: GP

## 2025-05-14 ASSESSMENT — PAIN SCALES - GENERAL: PAINLEVEL_OUTOF10: 2

## 2025-05-14 ASSESSMENT — PAIN - FUNCTIONAL ASSESSMENT: PAIN_FUNCTIONAL_ASSESSMENT: 0-10

## 2025-05-14 NOTE — PROGRESS NOTES
Physical Therapy Treatment    Patient Name: Shy Osorio  MRN: [id number]  Today's Date: 5/14/2025  Time Calculation  Start Time: 1100  Stop Time: 1145  Time Calculation (min): 45 min  PT Therapeutic Procedures Time Entry  Manual Therapy Time Entry: 15  Therapeutic Exercise Time Entry: 30    Insurance:  Visit number: Number of Treatments Authorized: 4/30  Authorization info: 25 [id number] COVERAGE OOP 4000 30 V A YR (DRY NEEDLE COVERED )REF # [id number]  Insurance Type: Payor: [employer] / Plan: [employer] PLAN / Product Type: *No Product type* /     Current Problem   1. Lumbar paraspinal muscle spasm  Follow Up In Physical Therapy          Subjective   General   Reason for Referral: T/L pain  Referred By: Ange  General Comment: PT STATES SHE IS HAVING MORE PAIN IN HER NECK AND PIRIFORMIS THIS MORNING.  Precautions:  Precautions  Precautions Comment: NONE  Pain   Pain Assessment: 0-10  0-10 (Numeric) Pain Score: 2  Pain Type: Chronic pain  Pain Location: Back  Pain Orientation: Left  Post Treatment Pain Level 1/10    Objective       Treatments:  Therapeutic Exercise:  Therapeutic Exercise  Therapeutic Exercise Activity 1: scifit - LE x5min L5  Therapeutic Exercise Activity 2: PEC STRETCH MID X 1 MIN  Therapeutic Exercise Activity 3: CERV RETRACTION X 1 MIN  Therapeutic Exercise Activity 4: 65cm - rollouts center/R/L x 3min  Therapeutic Exercise Activity 5: 65 cm - bouncing/PPT/APT 2min  Therapeutic Exercise Activity 6: dynamics - scoops/BW TD/fig 4/HTB x10min  Therapeutic Exercise Activity 7: modified benji with stretch strap x2min  Therapeutic Exercise Activity 8: bosu med R/L lat x 4  Therapeutic Exercise Activity 9: bosu-med fwd/retrox4  Therapeutic Exercise Activity 10: bosu med - R/L hip abdx 20  Therapeutic Exercise Activity 11: bosu med - r/l hip flex x 20     Manual:  Manual Therapy  Manual Therapy Performed: Yes  Manual Therapy Activity 1: passive - R/L HS/glutes/pirif/psoas/quad  Manual Therapy Activity 2:  TRPR - R/L glute/pirif/T-L paraspinals  Assessment   Assessment:   PT Assessment  Assessment Comment: Pt rima all acivities. Reduced trpf s/p manual release glutes/pirif    Plan:   OP PT Plan  Treatment/Interventions: Dry needling, Education/ Instruction, Electrical stimulation, Manual therapy, Neuromuscular re-education, Taping techniques, Therapeutic activities, Therapeutic exercises  PT Plan: Skilled PT (INITIATE SCAP AND CORE STRENGTHENING NEXT VISIT)  PT Frequency: 2 times per week  Duration: 5weeks  Number of Treatments Authorized: 4/30  Rehab Potential: Excellent  Plan of Care Agreement: Patient    OP EDUCATION:  Outpatient Education  Education Comment: CONTINUE WITH CURRENT HEP  Recommend - TRPR cane for home use.  Goals:

## 2025-06-20 ENCOUNTER — APPOINTMENT (OUTPATIENT)
Dept: ORTHOPEDIC SURGERY | Facility: CLINIC | Age: 45
End: 2025-06-20
Payer: COMMERCIAL

## 2025-06-20 DIAGNOSIS — M54.16 LUMBAR RADICULOPATHY: ICD-10-CM

## 2025-06-20 DIAGNOSIS — M54.42 CHRONIC LEFT-SIDED LOW BACK PAIN WITH LEFT-SIDED SCIATICA: ICD-10-CM

## 2025-06-20 DIAGNOSIS — M51.360 DISCOGENIC LOW BACK PAIN: Primary | ICD-10-CM

## 2025-06-20 DIAGNOSIS — G89.29 CHRONIC LEFT-SIDED LOW BACK PAIN WITH LEFT-SIDED SCIATICA: ICD-10-CM

## 2025-06-20 PROCEDURE — 99204 OFFICE O/P NEW MOD 45 MIN: CPT | Performed by: ORTHOPAEDIC SURGERY

## 2025-06-20 ASSESSMENT — PAIN - FUNCTIONAL ASSESSMENT: PAIN_FUNCTIONAL_ASSESSMENT: 0-10

## 2025-06-20 ASSESSMENT — PAIN SCALES - GENERAL: PAINLEVEL_OUTOF10: 8

## 2025-06-20 NOTE — LETTER
June 23, 2025     Christian Cassidy DO  [address]  [place] [address]    Patient: Shy Osorio   YOB: 1980   Date of Visit: 6/20/2025       Dear Dr. Christian Cassidy DO:    Thank you for referring Shy Osorio to me for evaluation. Below are my notes for this consultation.  If you have questions, please do not hesitate to call me. I look forward to following your patient along with you.       Sincerely,     Gordo Ogden MD      CC: No Recipients  ______________________________________________________________________________________    HPI:Shy Osorio is a pleasant 44-year-old woman, who comes in with complaints of chronic low back pain.  It is somewhat constant but but variable intensity.  She has occasional radiation to left buttock and down the left leg with numbness and tingling.  It is worse with bending.  She does yoga.  She has done physical therapy.  She has not had steroid injections.      ROS:  Reviewed on EMR and patient intake sheet.    PMH/SH:  Reviewed on EMR and patient intake sheet.    Exam:  Physical Exam    Constitutional: Well appearing; no acute distress  Eyes: pupils are equal and round  Psych: normal affect  Respiratory: non-labored breathing  Cardiovascular: regular rate and rhythm  GI: non-distended abdomen  Musculoskeletal: no pain with range of motion of the hips bilaterally  Neurologic: [5]/5 strength in the lower extremities bilaterally]; [-] straight leg raise    Radiology:     X-rays demonstrate moderate disc degeneration L5-S1    Diagnosis:    Discogenic low back pain; lumbar radiculopathy    Assessment and Plan:   44-year-old woman, with chronic discogenic low back pain and mild radicular symptoms.  The natural history of disc degeneration was discussed at length.  I stressed that the natural history of disc degeneration is usually favorable, with pain and disability decreasing over time when treated with a multi-modal, focused rehabilitation program.  I encouraged,  therefore, continued non-operative care, focusing on core strengthening.    If the radicular symptoms, to come more intolerable, then steroid injections would be another option.  We discussed the role of pain management and steroid injections.  This included a brief overview of the injection procedure itself, the rationale behind this procedure, and the appropriate expectations for treatment of the patient's pain.  A referral to a pain management specialist was offered to the patient.    I can see her back as needed.    At the end of the visit, I asked the patient if there were any further questions.  Although no further questions were provided at this time, I would be happy to see the patient back in the future to discuss any further questions or concerns.      Gordo Ogden MD    Chief of Spine Surgery, [facility]  Director of Spine Service, [facility]  , Department of Orthopaedics  [facility] [employer] [facility]  [address] [id number]  P: [phone]  [facility].[employer]    This note was dictated with voice recognition software.  It has not been proofread for grammatical errors, typographical mistakes or other semantic inconsistencies.

## 2025-06-23 NOTE — PROGRESS NOTES
HPI:Shy Osorio is a pleasant 44-year-old woman, who comes in with complaints of chronic low back pain.  It is somewhat constant but but variable intensity.  She has occasional radiation to left buttock and down the left leg with numbness and tingling.  It is worse with bending.  She does yoga.  She has done physical therapy.  She has not had steroid injections.      ROS:  Reviewed on EMR and patient intake sheet.    PMH/SH:  Reviewed on EMR and patient intake sheet.    Exam:  Physical Exam    Constitutional: Well appearing; no acute distress  Eyes: pupils are equal and round  Psych: normal affect  Respiratory: non-labored breathing  Cardiovascular: regular rate and rhythm  GI: non-distended abdomen  Musculoskeletal: no pain with range of motion of the hips bilaterally  Neurologic: [5]/5 strength in the lower extremities bilaterally]; [-] straight leg raise    Radiology:     X-rays demonstrate moderate disc degeneration L5-S1    Diagnosis:    Discogenic low back pain; lumbar radiculopathy    Assessment and Plan:   44-year-old woman, with chronic discogenic low back pain and mild radicular symptoms.  The natural history of disc degeneration was discussed at length.  I stressed that the natural history of disc degeneration is usually favorable, with pain and disability decreasing over time when treated with a multi-modal, focused rehabilitation program.  I encouraged, therefore, continued non-operative care, focusing on core strengthening.    If the radicular symptoms, to come more intolerable, then steroid injections would be another option.  We discussed the role of pain management and steroid injections.  This included a brief overview of the injection procedure itself, the rationale behind this procedure, and the appropriate expectations for treatment of the patient's pain.  A referral to a pain management specialist was offered to the patient.    I can see her back as needed.    At the end of the visit, I asked  the patient if there were any further questions.  Although no further questions were provided at this time, I would be happy to see the patient back in the future to discuss any further questions or concerns.      Gordo Ogden MD    Chief of Spine Surgery, [facility]  Director of Spine Service, [facility]  , Department of Orthopaedics  [facility] [employer] [facility]  [address] [id number]  P: [phone]  [email or web address]    This note was dictated with voice recognition software.  It has not been proofread for grammatical errors, typographical mistakes or other semantic inconsistencies.

## 2025-09-04 ENCOUNTER — APPOINTMENT (OUTPATIENT)
Dept: PRIMARY CARE | Facility: CLINIC | Age: 45
End: 2025-09-04
Payer: COMMERCIAL